# Patient Record
Sex: FEMALE | Race: WHITE | NOT HISPANIC OR LATINO | URBAN - NONMETROPOLITAN AREA
[De-identification: names, ages, dates, MRNs, and addresses within clinical notes are randomized per-mention and may not be internally consistent; named-entity substitution may affect disease eponyms.]

---

## 2017-01-11 ENCOUNTER — OFFICE VISIT (OUTPATIENT)
Dept: PSYCHIATRY | Facility: CLINIC | Age: 32
End: 2017-01-11

## 2017-01-11 VITALS — BODY MASS INDEX: 24.64 KG/M2 | WEIGHT: 157 LBS | HEIGHT: 67 IN

## 2017-01-11 DIAGNOSIS — F40.10 SOCIAL ANXIETY DISORDER: ICD-10-CM

## 2017-01-11 DIAGNOSIS — F90.2 ATTENTION DEFICIT HYPERACTIVITY DISORDER, COMBINED TYPE: Primary | ICD-10-CM

## 2017-01-11 PROCEDURE — 90792 PSYCH DIAG EVAL W/MED SRVCS: CPT | Performed by: NURSE PRACTITIONER

## 2017-01-11 RX ORDER — DEXTROAMPHETAMINE SACCHARATE, AMPHETAMINE ASPARTATE, DEXTROAMPHETAMINE SULFATE AND AMPHETAMINE SULFATE 2.5; 2.5; 2.5; 2.5 MG/1; MG/1; MG/1; MG/1
TABLET ORAL
Qty: 30 TABLET | Refills: 0
Start: 2017-01-11 | End: 2017-02-22 | Stop reason: SDUPTHER

## 2017-01-11 RX ORDER — DEXTROAMPHETAMINE SACCHARATE, AMPHETAMINE ASPARTATE MONOHYDRATE, DEXTROAMPHETAMINE SULFATE AND AMPHETAMINE SULFATE 5; 5; 5; 5 MG/1; MG/1; MG/1; MG/1
20 CAPSULE, EXTENDED RELEASE ORAL EVERY MORNING
Qty: 30 CAPSULE | Refills: 0
Start: 2017-01-11 | End: 2017-02-22 | Stop reason: SDUPTHER

## 2017-01-11 NOTE — PROGRESS NOTES
"    Subjective   Caryl Baltazar is a 31 y.o. , no children, employed, college graduate female who is here today for initial appointment. She and her  relocated to [place] from [place] for her 's work. Patient works from home     Chief Complaint:  Continuity of treatment for ADHD combined type      History of Present Illness  Patient presents by herself for psychiatric evaluation with intent to continue her treatment of ADHD. She reports struggle with academics throughout her primary school years only being able to get C's most of the time. She and her parents would work hard on her focus attention and ability to complete work but she continued to struggle. As a freshman in college her mother took her to be evaluated because she was failing college and knew her daughter was smart. She was diagnosed ADHD combined type and placed on treatment. She got straight A's and received a [employer] degree in statistics. 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 She describes when off medication she is a \"squirrel' easily distracted, restless, bores easily, can be going all day and not accomplish anything which cause great frustration. When she moved to [place] her MD from [place] gave her three scripts, her new PCP did not want to prescribe her medication and referred her here she reports. Confirmed with progress notes from [facility]. Renaldo reviewed and her statement is confirmed. Her UDS today was positive for amphetamine and she took 1/2 tablet two days ago which did spill in her urine, no other substances. Patient denies depression in past or suicidal thoughts or self harm. She denies illicit drug use. She drinks alcohol only socially and only occasionally. She reports no tobacco use. She denies abuse hx or legal issues ever. Patient likes to be organized with everything in its place but not OCD symptoms. Denies manic like symptoms. Does " have anxiety related to meeting new people and new situations alone. She will be very anxious she states in an interview or initiating a conversation with someone she doesn't know. She will be become sweaty, heart races, uncomfortable and worried she will be judged. This has caused some interruption in life with isolating and not open to new opportunities such as joining a book club on her own or going on job interviews. She reports good sleep at night, appetite as good. She denies adverse effects from current medication Adderall XR 20mg PO one AM and prn Adderall 10mg PO in afternoons as needed for focus. Patient denies irritability , paranoid thoughts, SI/HI or AVH ever. She denies seizures, denies pregnancy and she and  don't plan on children for 2 more years. She reports being on birth control and is compliant with the pill. She denies anger issues or aggressive behaviors ever. She and her  she describes as best friends and have been together nine years,  8 months. She denies inpatient treatment only outpatient for ADHD.     The following portions of the patient's history were reviewed and updated as appropriate: allergies, current medications, past family history, past medical history, past social history, past surgical history and problem list.      Past Psych History: Patient reports only outpatient treatment for ADHD beginning in Freshman year college and since. She is on Adderall XR 20mg PO one QAM when she works or needs focus and concentration skills. She is also on Adderall 10mg in afternoon as needed for booster to get through most work days. She denies suicidal attempts or thoughts ever, denies self harm behaviors ever. Denies inpatient psych treatment ever.     Substance Abuse:   Nicotine: denies   Alcohol: occasional social   Cannabis: denies   Benzodiazepines: denies   Opioids: denies   Other illicit drugs: denies     ABUSE HX: denies   LEGAL HX: denies     MAIKOL REVIEWED: no  "red flags  UDS: amphetamine prescribed     Family Psychiatric History:  family history is not on file.  Mother ADHD       Social History:  Born in [place] raised by her biological parents in [place] [facility], has one older brother who is biomed , mother is , dad is . Patient graduated from high school and went to [employer] but almost failed freshman year, got evaluated for ADHD, had moved back home was treated and restarted school with getting straight A's and graduating with BS in Statistics. She has been with her  9 years total,  for 8 months. Lived in [place] for  \"[place]'s work, he is Oil dispatch and works for [employer] in [facility] now\". They moved to [place] last June 2016. No children, planning in two years. Patient works for [employer] out of [facility] from [phone] and again 2015 to present, works from home, they purchased house and she is working on painting walls and decorating. They have two dogs and live on land in [place].     DEVELOPMENTAL HX: denies problems, was full term.     Medical/Surgical History:  Past Medical History   Diagnosis Date   • Asthma      Past Surgical History   Procedure Laterality Date   • Breast augmentation  01/2009       No Known Allergies    Current Medications:   Current Outpatient Prescriptions   Medication Sig Dispense Refill   • amphetamine-dextroamphetamine (ADDERALL) 10 MG tablet Take one tablet in afternoon as needed for focus 30 tablet 0   • amphetamine-dextroamphetamine XR (ADDERALL XR) 20 MG 24 hr capsule Take 1 capsule by mouth Every Morning. 30 capsule 0     No current facility-administered medications for this visit.          Review of Systems   Psychiatric/Behavioral: Positive for decreased concentration.   All other systems reviewed and are negative.   denies HEENT, cardiovascular, respiratory (has h/o asthma but has never used inhaler), liver, renal, GI/, " "endocrine, neuro, DERM, hematology, immunology, musculoskeletal disorders.  Denies concussions or head injuries, denies seizures ever   Objective   Physical Exam  Height 67\" (170.2 cm), weight 157 lb (71.2 kg).    Mental Status Exam:   Appearance: appropriate  Hygiene:   good  Cooperation:  Cooperative  Eye Contact:  Good  Psychomotor Behavior:  Appropriate  Mood:  anxious and euthymic  Affect:  Appropriate  Hopelessness: Denies  Speech:  Normal  Thought Process:  Linear  Thought Content:  Normal  Suicidal:  None  Homicidal:  None  Hallucinations:  None  Delusion:  None  Memory:  Intact  Orientation:  Person, Place, Time and Situation  Reliability:  good  Insight:  Good  Judgement:  Good  Impulse Control:  Good  Physical/Medical Issues:  No       Short-term goals: Patient will be compliant with clinic appointments.  Patient will be engaged in therapy, medication compliant with minimal side effects. Patient  will report decrease of symptoms and frequency.    Long-term goals: Patient will have minimal symptoms of mental health disorder with continued treatment. Patient will be compliant with treatment and appointments.       Problem list:  Focus attention organization ADHD sx's when not on med management , anxiety socially   Strengths: patient appears motivated for treatment is currently engaged and compliant supportive family  Weaknesses: coping skills        Assessment/Plan   Diagnoses and all orders for this visit:    Attention deficit hyperactivity disorder, combined type    Social anxiety disorder    Other orders  -     amphetamine-dextroamphetamine XR (ADDERALL XR) 20 MG 24 hr capsule; Take 1 capsule by mouth Every Morning.  -     amphetamine-dextroamphetamine (ADDERALL) 10 MG tablet; Take one tablet in afternoon as needed for focus      Discussed diagnosis and treatment plan with continuing what has been tolerated well and efficacious Adderall XR 20mg PO one in am and Adderall 10mg PO one tablet in afternoon as " needed for focus in later afternoons for work. Patient reports she doesn't usually take in afternoons or if not working, only when needs good concentration focus skills. Discussed social anxiety and symptoms that make socializing difficult and being out in public, discussed treatment including therapy and medication management with SSRI discussed citalopram or Lexapro, Prozac, and discussed risks, benefits, and side effects of the above medications and she stated she would think about treatment for anxiety. She  was agreeable with the plan for ADHD treatment and f/u plan.Patient was educated on the importance of compliance with treatment and follow-up appointments. Control Substance Policy was reviewed with MAIKOL and yoel UDS. Also to only get prescriptions from this provider or practice, and illegal to share medication or risk to be non compliant with instructions.   ·   Controlled substance prescriptions are either  printed off, telephoned in  or ordered through [facility] by provider    To call for questions or concerns and return early if necessary. Crisis plan reviewed including going to the Emergency department.     Return in about 3 months (around 4/11/2017).
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
## 2017-02-22 RX ORDER — DEXTROAMPHETAMINE SACCHARATE, AMPHETAMINE ASPARTATE MONOHYDRATE, DEXTROAMPHETAMINE SULFATE AND AMPHETAMINE SULFATE 5; 5; 5; 5 MG/1; MG/1; MG/1; MG/1
20 CAPSULE, EXTENDED RELEASE ORAL EVERY MORNING
Qty: 30 CAPSULE | Refills: 0
Start: 2017-02-22 | End: 2017-04-03 | Stop reason: SDUPTHER

## 2017-02-22 RX ORDER — DEXTROAMPHETAMINE SACCHARATE, AMPHETAMINE ASPARTATE, DEXTROAMPHETAMINE SULFATE AND AMPHETAMINE SULFATE 2.5; 2.5; 2.5; 2.5 MG/1; MG/1; MG/1; MG/1
TABLET ORAL
Qty: 30 TABLET | Refills: 0
Start: 2017-02-22 | End: 2017-04-03 | Stop reason: SDUPTHER

## 2017-02-22 NOTE — TELEPHONE ENCOUNTER
No I can't send these [facility], she needs to  at [facility] Yocasta Parkinson as last time with her ID.  Thanks so much I have refilled via [facility]

## 2017-04-03 RX ORDER — DEXTROAMPHETAMINE SACCHARATE, AMPHETAMINE ASPARTATE, DEXTROAMPHETAMINE SULFATE AND AMPHETAMINE SULFATE 2.5; 2.5; 2.5; 2.5 MG/1; MG/1; MG/1; MG/1
TABLET ORAL
Qty: 30 TABLET | Refills: 0
Start: 2017-04-03 | End: 2017-05-09 | Stop reason: SDUPTHER

## 2017-04-03 RX ORDER — DEXTROAMPHETAMINE SACCHARATE, AMPHETAMINE ASPARTATE MONOHYDRATE, DEXTROAMPHETAMINE SULFATE AND AMPHETAMINE SULFATE 5; 5; 5; 5 MG/1; MG/1; MG/1; MG/1
20 CAPSULE, EXTENDED RELEASE ORAL EVERY MORNING
Qty: 30 CAPSULE | Refills: 0
Start: 2017-04-03 | End: 2017-05-09 | Stop reason: SDUPTHER

## 2017-04-13 ENCOUNTER — OFFICE VISIT (OUTPATIENT)
Dept: PSYCHIATRY | Facility: CLINIC | Age: 32
End: 2017-04-13

## 2017-04-13 VITALS — WEIGHT: 155 LBS | BODY MASS INDEX: 24.33 KG/M2 | HEIGHT: 67 IN

## 2017-04-13 DIAGNOSIS — F90.2 ATTENTION DEFICIT HYPERACTIVITY DISORDER, COMBINED TYPE: Primary | ICD-10-CM

## 2017-04-13 DIAGNOSIS — F40.10 SOCIAL ANXIETY DISORDER: ICD-10-CM

## 2017-04-13 PROCEDURE — 99213 OFFICE O/P EST LOW 20 MIN: CPT | Performed by: NURSE PRACTITIONER

## 2017-04-13 NOTE — PROGRESS NOTES
"    Subjective   Caryl Baltazar is a 32 y.o. female who is here today for medication management follow up.    Chief Complaint: Diagnoses and all orders for this visit:    Attention deficit hyperactivity disorder, combined type    Social anxiety disorder      History of Present Illness  Good cognitive processing, completing tasks, staying focused. Mood improved, trying to do more socially. Going to [place] with  and parents in July. Feels more secure and confident. Sleeping well and eating well, goes to gym and works out. Feels positive about life and grateful. No new concerns. Scores anxiety 3.    (Scales based on 0 - 10 with 10 being the worst)        The following portions of the patient's history were reviewed and updated as appropriate: allergies, current medications, past family history, past medical history, past social history, past surgical history and problem list.    Review of Systemsdenies fever, cough, s/s’s of infection, denies GI/ problems, denies new medical issues     Objective   Physical Exam  Height 67\" (170.2 cm), weight 155 lb (70.3 kg).    No Known Allergies    Current Medications:   Current Outpatient Prescriptions   Medication Sig Dispense Refill   • amphetamine-dextroamphetamine (ADDERALL) 10 MG tablet Take one tablet in afternoon as needed for focus 30 tablet 0   • amphetamine-dextroamphetamine XR (ADDERALL XR) 20 MG 24 hr capsule Take 1 capsule by mouth Every Morning. 30 capsule 0     No current facility-administered medications for this visit.        Mental Status Exam:   Appearance: appropriate  Hygiene:   good  Cooperation:  Cooperative  Eye Contact:  Good  Psychomotor Behavior:  Appropriate  Mood:  euthymic  Affect:  Appropriate  Hopelessness: Denies  Speech:  Normal  Thought Process:  Linear  Thought Content:  Normal  Suicidal:  None  Homicidal:  None  Hallucinations:  None  Delusion:  None  Memory:  Intact  Orientation:  Person, Place, Time and Situation  Reliability:  " good  Insight:  Good  Judgement:  Good  Impulse Control:  Good  Estimated Intelligence: average range   Physical/Medical Issues:  No     Assessment/Plan   Diagnoses and all orders for this visit:    Attention deficit hyperactivity disorder, combined type    Social anxiety disorder      Cont med management has refills,   ·       We discussed risks, benefits, and side effects of the above medications and the patient was agreeable with the plan. Patient was educated on the importance of compliance with treatment and follow-up appointments.   Controlled substance prescriptions are either  printed off for patient, telephoned in  or ordered through [facility] by this provider  Instructed to call for questions or concerns and return early if necessary. Crisis plan reviewed including going to the Emergency department.    Return in about 3 months (around 7/13/2017).

## 2017-05-09 RX ORDER — DEXTROAMPHETAMINE SACCHARATE, AMPHETAMINE ASPARTATE MONOHYDRATE, DEXTROAMPHETAMINE SULFATE AND AMPHETAMINE SULFATE 5; 5; 5; 5 MG/1; MG/1; MG/1; MG/1
20 CAPSULE, EXTENDED RELEASE ORAL EVERY MORNING
Qty: 30 CAPSULE | Refills: 0
Start: 2017-05-09 | End: 2017-06-13 | Stop reason: SDUPTHER

## 2017-05-09 RX ORDER — DEXTROAMPHETAMINE SACCHARATE, AMPHETAMINE ASPARTATE, DEXTROAMPHETAMINE SULFATE AND AMPHETAMINE SULFATE 2.5; 2.5; 2.5; 2.5 MG/1; MG/1; MG/1; MG/1
TABLET ORAL
Qty: 30 TABLET | Refills: 0
Start: 2017-05-09 | End: 2017-06-13 | Stop reason: SDUPTHER

## 2017-06-14 RX ORDER — DEXTROAMPHETAMINE SACCHARATE, AMPHETAMINE ASPARTATE MONOHYDRATE, DEXTROAMPHETAMINE SULFATE AND AMPHETAMINE SULFATE 5; 5; 5; 5 MG/1; MG/1; MG/1; MG/1
20 CAPSULE, EXTENDED RELEASE ORAL EVERY MORNING
Qty: 30 CAPSULE | Refills: 0
Start: 2017-06-14 | End: 2017-07-13 | Stop reason: SDUPTHER

## 2017-06-14 RX ORDER — DEXTROAMPHETAMINE SACCHARATE, AMPHETAMINE ASPARTATE, DEXTROAMPHETAMINE SULFATE AND AMPHETAMINE SULFATE 2.5; 2.5; 2.5; 2.5 MG/1; MG/1; MG/1; MG/1
TABLET ORAL
Qty: 30 TABLET | Refills: 0
Start: 2017-06-14 | End: 2017-07-13 | Stop reason: SDUPTHER

## 2017-07-13 ENCOUNTER — OFFICE VISIT (OUTPATIENT)
Dept: PSYCHIATRY | Facility: CLINIC | Age: 32
End: 2017-07-13

## 2017-07-13 VITALS — HEIGHT: 67 IN | BODY MASS INDEX: 23.07 KG/M2 | WEIGHT: 147 LBS

## 2017-07-13 DIAGNOSIS — F40.10 SOCIAL ANXIETY DISORDER: ICD-10-CM

## 2017-07-13 DIAGNOSIS — F90.2 ATTENTION DEFICIT HYPERACTIVITY DISORDER, COMBINED TYPE: Primary | ICD-10-CM

## 2017-07-13 PROCEDURE — 99213 OFFICE O/P EST LOW 20 MIN: CPT | Performed by: NURSE PRACTITIONER

## 2017-07-13 RX ORDER — DEXTROAMPHETAMINE SACCHARATE, AMPHETAMINE ASPARTATE MONOHYDRATE, DEXTROAMPHETAMINE SULFATE AND AMPHETAMINE SULFATE 5; 5; 5; 5 MG/1; MG/1; MG/1; MG/1
20 CAPSULE, EXTENDED RELEASE ORAL EVERY MORNING
Qty: 30 CAPSULE | Refills: 0
Start: 2017-07-13 | End: 2017-08-22 | Stop reason: SDUPTHER

## 2017-07-13 RX ORDER — DEXTROAMPHETAMINE SACCHARATE, AMPHETAMINE ASPARTATE, DEXTROAMPHETAMINE SULFATE AND AMPHETAMINE SULFATE 2.5; 2.5; 2.5; 2.5 MG/1; MG/1; MG/1; MG/1
TABLET ORAL
Qty: 30 TABLET | Refills: 0
Start: 2017-07-13 | End: 2017-08-22 | Stop reason: SDUPTHER

## 2017-07-13 NOTE — PROGRESS NOTES
"  Subjective   Caryl Baltazar is a 32 y.o. female who is here today for medication management follow up.    Chief Complaint: ADHD and social anxiety     History of Present Illness Good cognitive processing, completing tasks, staying focused. Mood improved, trying to do more socially. Went to [place] with  and parents and had a great time. Feels more secure and confident. Sleeping well and eating well, goes to gym and works out. Feels positive about life and grateful. No new concerns. Scores anxiety 3 or less.       (Scales based on 0 - 10 with 10 being the worst)        The following portions of the patient's history were reviewed and updated as appropriate: allergies, current medications, past family history, past medical history, past social history, past surgical history and problem list.    Review of Systemsdenies fever, cough, s/s’s of infection, denies GI/ problems, denies new medical issues     Objective   Physical Exam  Height 67\" (170.2 cm), weight 147 lb (66.7 kg).    No Known Allergies    Current Medications:   Current Outpatient Prescriptions   Medication Sig Dispense Refill   • amphetamine-dextroamphetamine (ADDERALL) 10 MG tablet Take one tablet in afternoon as needed for focus 30 tablet 0   • amphetamine-dextroamphetamine XR (ADDERALL XR) 20 MG 24 hr capsule Take 1 capsule by mouth Every Morning. 30 capsule 0     No current facility-administered medications for this visit.        Mental Status Exam:   Appearance: appropriate  Hygiene:   good  Cooperation:  Cooperative  Eye Contact:  Good  Psychomotor Behavior:  Appropriate  Mood:  euthymic  Affect:  Appropriate  Hopelessness: Denies  Speech:  Normal  Thought Process:  Linear  Thought Content:  Normal  Suicidal:  None  Homicidal:  None  Hallucinations:  None  Delusion:  None  Memory:  Intact  Orientation:  Person, Place, Time and Situation  Reliability:  good  Insight:  Good  Judgement:  Good  Impulse Control:  Good  Estimated Intelligence: " average range   Physical/Medical Issues:  No     Assessment/Plan   Diagnoses and all orders for this visit:    Attention deficit hyperactivity disorder, combined type    Social anxiety disorder    Other orders  -     amphetamine-dextroamphetamine (ADDERALL) 10 MG tablet; Take one tablet in afternoon as needed for focus  -     amphetamine-dextroamphetamine XR (ADDERALL XR) 20 MG 24 hr capsule; Take 1 capsule by mouth Every Morning.        Stable, refill medications     We discussed risks, benefits, and side effects of the above medications and the patient was agreeable with the plan. Patient was educated on the importance of compliance with treatment and follow-up appointments.   Controlled substance prescriptions are either  printed off for patient, telephoned in  or ordered through RX[facility] by this provider  Instructed to call for questions or concerns and return early if necessary. Crisis plan reviewed including going to the Emergency department.    Return in about 3 months (around 10/13/2017).

## 2017-08-22 RX ORDER — DEXTROAMPHETAMINE SACCHARATE, AMPHETAMINE ASPARTATE MONOHYDRATE, DEXTROAMPHETAMINE SULFATE AND AMPHETAMINE SULFATE 5; 5; 5; 5 MG/1; MG/1; MG/1; MG/1
20 CAPSULE, EXTENDED RELEASE ORAL EVERY MORNING
Qty: 30 CAPSULE | Refills: 0
Start: 2017-08-22 | End: 2017-10-05 | Stop reason: SDUPTHER

## 2017-08-22 RX ORDER — DEXTROAMPHETAMINE SACCHARATE, AMPHETAMINE ASPARTATE, DEXTROAMPHETAMINE SULFATE AND AMPHETAMINE SULFATE 2.5; 2.5; 2.5; 2.5 MG/1; MG/1; MG/1; MG/1
TABLET ORAL
Qty: 30 TABLET | Refills: 0
Start: 2017-08-22 | End: 2017-10-05 | Stop reason: SDUPTHER

## 2017-10-05 RX ORDER — DEXTROAMPHETAMINE SACCHARATE, AMPHETAMINE ASPARTATE, DEXTROAMPHETAMINE SULFATE AND AMPHETAMINE SULFATE 2.5; 2.5; 2.5; 2.5 MG/1; MG/1; MG/1; MG/1
TABLET ORAL
Qty: 30 TABLET | Refills: 0
Start: 2017-10-05 | End: 2017-10-12

## 2017-10-05 RX ORDER — DEXTROAMPHETAMINE SACCHARATE, AMPHETAMINE ASPARTATE MONOHYDRATE, DEXTROAMPHETAMINE SULFATE AND AMPHETAMINE SULFATE 5; 5; 5; 5 MG/1; MG/1; MG/1; MG/1
20 CAPSULE, EXTENDED RELEASE ORAL EVERY MORNING
Qty: 30 CAPSULE | Refills: 0
Start: 2017-10-05 | End: 2017-10-12 | Stop reason: SDUPTHER

## 2017-10-12 ENCOUNTER — OFFICE VISIT (OUTPATIENT)
Dept: PSYCHIATRY | Facility: CLINIC | Age: 32
End: 2017-10-12

## 2017-10-12 VITALS — HEIGHT: 67 IN | BODY MASS INDEX: 21.82 KG/M2 | WEIGHT: 139 LBS

## 2017-10-12 DIAGNOSIS — F40.10 SOCIAL ANXIETY DISORDER: ICD-10-CM

## 2017-10-12 DIAGNOSIS — F90.2 ATTENTION DEFICIT HYPERACTIVITY DISORDER, COMBINED TYPE: Primary | ICD-10-CM

## 2017-10-12 PROCEDURE — 99213 OFFICE O/P EST LOW 20 MIN: CPT | Performed by: NURSE PRACTITIONER

## 2017-10-12 RX ORDER — DEXTROAMPHETAMINE SACCHARATE, AMPHETAMINE ASPARTATE MONOHYDRATE, DEXTROAMPHETAMINE SULFATE AND AMPHETAMINE SULFATE 7.5; 7.5; 7.5; 7.5 MG/1; MG/1; MG/1; MG/1
30 CAPSULE, EXTENDED RELEASE ORAL EVERY MORNING
Qty: 30 CAPSULE | Refills: 0
Start: 2017-10-12 | End: 2017-11-13 | Stop reason: SDUPTHER

## 2017-10-12 RX ORDER — DEXTROAMPHETAMINE SACCHARATE, AMPHETAMINE ASPARTATE, DEXTROAMPHETAMINE SULFATE AND AMPHETAMINE SULFATE 5; 5; 5; 5 MG/1; MG/1; MG/1; MG/1
TABLET ORAL
Qty: 30 TABLET | Refills: 0
Start: 2017-10-12 | End: 2017-11-13 | Stop reason: SDUPTHER

## 2017-10-12 NOTE — PROGRESS NOTES
"  Subjective   Caryl Baltazar is a 32 y.o. female who is here today for medication management follow up.    Chief Complaint: ADHD    History of Present Illness Patient presents by herself for f/u. She reports she is working now and notices in afternoons she is much less focused and difficulty completing tasks. She reports euthymic mood and denies anxiety. She states she feels a lot better working and more engaged with others, denies panic or increase anxiety socially. She is having a surprise party for her  this weekend and is enjoying the planning of it. Denies adverse effects from medication. Sleeping and her eating is same but stands at work long hours and is much more active so weight is coming off but healthy.     (Scales based on 0 - 10 with 10 being the worst)        The following portions of the patient's history were reviewed and updated as appropriate: allergies, current medications, past family history, past medical history, past social history, past surgical history and problem list.    Review of Systemsdenies fever, cough, s/s’s of infection, denies GI/ problems, denies new medical issues     Objective   Physical Exam  Height 67\" (170.2 cm), weight 139 lb (63 kg).    No Known Allergies    Current Medications:   Current Outpatient Prescriptions   Medication Sig Dispense Refill   • amphetamine-dextroamphetamine (ADDERALL) 20 MG tablet Take one tablet in afternoon daily 30 tablet 0   • amphetamine-dextroamphetamine XR (ADDERALL XR) 30 MG 24 hr capsule Take 1 capsule by mouth Every Morning. 30 capsule 0     No current facility-administered medications for this visit.        Mental Status Exam:   Appearance: appropriate  Hygiene:   good  Cooperation:  Cooperative  Eye Contact:  Good  Psychomotor Behavior:  Appropriate  Mood:  within normal limits  Affect:  Appropriate  Hopelessness: Denies  Speech:  Normal  Thought Process:  Linear  Thought Content:  Normal  Suicidal:  None  Homicidal:  " None  Hallucinations:  None  Delusion:  None  Memory:  Intact  Orientation:  Person, Place, Time and Situation  Reliability:  good  Insight:  Good  Judgement:  Good  Impulse Control:  Good  Estimated Intelligence: average range   Physical/Medical Issues:  No     Assessment/Plan   Diagnoses and all orders for this visit:    Attention deficit hyperactivity disorder, combined type    Social anxiety disorder    Other orders  -     amphetamine-dextroamphetamine XR (ADDERALL XR) 30 MG 24 hr capsule; Take 1 capsule by mouth Every Morning.  -     amphetamine-dextroamphetamine (ADDERALL) 20 MG tablet; Take one tablet in afternoon daily    residual ADHD sx's will increase morning Adderall xr to 30mg and increase afternoon dose to 20mg 1/2-1 tablet late afternoon.    We discussed risks, benefits, and side effects of the above medications and the patient was agreeable with the plan. Patient was educated on the importance of compliance with treatment and follow-up appointments.   Controlled substance prescriptions are either  printed off for patient, telephoned in  or ordered through [facility] by this provider  Instructed to call for questions or concerns and return early if necessary. Crisis plan reviewed including going to the Emergency department.    Return in about 3 months (around 1/12/2018).

## 2017-11-13 RX ORDER — DEXTROAMPHETAMINE SACCHARATE, AMPHETAMINE ASPARTATE MONOHYDRATE, DEXTROAMPHETAMINE SULFATE AND AMPHETAMINE SULFATE 7.5; 7.5; 7.5; 7.5 MG/1; MG/1; MG/1; MG/1
30 CAPSULE, EXTENDED RELEASE ORAL EVERY MORNING
Qty: 30 CAPSULE | Refills: 0
Start: 2017-11-13 | End: 2017-12-21 | Stop reason: SDUPTHER

## 2017-11-13 RX ORDER — DEXTROAMPHETAMINE SACCHARATE, AMPHETAMINE ASPARTATE, DEXTROAMPHETAMINE SULFATE AND AMPHETAMINE SULFATE 5; 5; 5; 5 MG/1; MG/1; MG/1; MG/1
TABLET ORAL
Qty: 30 TABLET | Refills: 0
Start: 2017-11-13 | End: 2017-12-21 | Stop reason: SDUPTHER

## 2017-11-29 ENCOUNTER — DOCUMENTATION (OUTPATIENT)
Dept: OTHER | Facility: HOSPITAL | Age: 32
End: 2017-11-29

## 2017-11-29 NOTE — PROGRESS NOTES
Tyrone from [facility] called today and left a message checking to see if patient has an appointment with Genetics. I talked with Jessica Spears in Genetics and she called the patient and left her a message to please call Genetics to schedule an appointment. AG

## 2017-12-08 ENCOUNTER — CLINICAL SUPPORT (OUTPATIENT)
Dept: GENETICS | Facility: HOSPITAL | Age: 32
End: 2017-12-08

## 2017-12-08 ENCOUNTER — LAB (OUTPATIENT)
Dept: LAB | Facility: HOSPITAL | Age: 32
End: 2017-12-08

## 2017-12-08 DIAGNOSIS — Z80.3 FAMILY HISTORY OF BREAST CANCER: ICD-10-CM

## 2017-12-08 DIAGNOSIS — Z13.79 GENETIC TESTING: Primary | ICD-10-CM

## 2017-12-08 DIAGNOSIS — Z80.42 FAMILY HISTORY OF PROSTATE CANCER: ICD-10-CM

## 2017-12-08 DIAGNOSIS — Z84.81 FAMILY HISTORY OF BRCA1 GENE POSITIVE: Primary | ICD-10-CM

## 2017-12-11 NOTE — PROGRESS NOTES
Caryl Baltazar is a 32-year old female who was seen for genetic counseling due to a family history of breast cancer, as well as a known BRCA1 mutation that was identified in her mother. She was 15 years old at menarche and is nulliparous. She retains her uterus and ovaries.  Ms. Baltazar was interested in discussing her risk for a BRCA1 mutation and opted to pursue single site testing for the known mutation identified in the family. Single site testing for the BRCA1 mutation was ordered through [facility]. Results are expected in 2-3 weeks.      PERTINENT FAMILY HISTORY: (See attached pedigree)   Mother:  Breast cancer, 46; Contralateral breast cancer, 49 (BRCA1+)  Mat. Grandmother: Pancreatic cancer, 90  Mat. Grandfather: Prostate cancer, 80  Mat. Great-Aunt: Breast cancer, 40s  Mat. Great- Aunt: Breast cancer, 50s  Mat. Great Aunt: Breast cancer, 40s; Fallopian tube cancer, 70s  Pat. Grandmother: Bladder cancer, 70s     A copy of Ms. Baltazar’s mother’s genetic test result confirming the BRCA1 mutation was available for review.    RISK ASSESSMENT:  Ms. Baltazar’s risk for carrying a BRCA1 mutation prior to testing is 50% based on family history.     GENETIC COUNSELING (30 minutes): We reviewed the family history information in detail.  Cases of cancer follow three general patterns: sporadic, familial, and hereditary.  While most cancer is sporadic, some cases appear to occur in family clusters.  These cases are said to be familial and account for 10-20% of breast cancer cases.  Familial cases may be due to a combination of shared genes and environmental factors among family members.  In even fewer families, the cancer is said to be inherited, and the genes responsible for the cancer are known.      Family histories typical of hereditary cancer syndromes usually include multiple first- and second-degree relatives diagnosed with cancer types that define a syndrome.  These cases tend to be diagnosed at  younger-than-expected ages and can be bilateral or multifocal.  The cancer in these families follows an autosomal dominant inheritance pattern, which indicates the likely presence of a mutation in a cancer susceptibility gene.  Children and siblings of an individual believed to carry this mutation have a 50% chance of inheriting that mutation, thereby inheriting the increased risk to develop cancer.  These mutations can be passed down from the maternal or the paternal lineage.     Based on Ms. Baltazar’s family history, we discussed hereditary breast and ovarian cancer syndrome. A significant proportion of hereditary breast and ovarian cancer can be attributed to mutations in the BRCA1 and BRCA2 genes.  Mutations in these genes confer an increased risk for breast cancer, ovarian cancer, male breast cancer, prostate cancer and pancreatic cancer.  Women with a BRCA1 mutation have up to an 87% risk of breast cancer, and up to a 44% risk of ovarian cancer. Additionally, there is an elevated risk for male breast cancer and pancreatic cancer, and up to a 16% risk for prostate cancer for males with a BRCA1 mutation.    GENETIC TESTING:  The risks, benefits and limitations of genetic testing and implications for clinical management following testing were reviewed.  DNA test results can influence decisions regarding screening, prevention and surgical management.  Genetic testing can have significant psychological implications for both individuals and families.  Also discussed was the possibility of employment and insurance discrimination based on genetic test results and the laws in place to prevent this. The implications of a positive or negative test result were discussed.    Genetic counseling and testing for the known familial BRCA1 mutation are available to Ms. Baltazar’ at-risk family members.  Until each at-risk family member has been proven not to carry a BRCA1 mutation, he or she should be offered increased surveillance  and chemoprevention.  We would be happy to see family members who live in the [place] area in our clinic to further discuss this information and testing options.  They can call our office at [phone] to schedule an appointment.  For family members who live elsewhere, there are genetic counselors at most [facility] centers.  They can find a genetic counselor by visiting the National Society of Genetic Counselors website at www.nsgc.org or they can call our office and we would be happy to give them the contact information of the closest genetic counselor.      PLAN: Ms. Baltazar opted to pursue single site BRCA1 testing through [facility]. Results are expected in 2-3 weeks at which time I will contact Ms. Baltazar by telephone. She is welcome to contact us in the meantime with any questions or concerns.         Kavitha Huffman MS  Genetic Counselor

## 2017-12-21 ENCOUNTER — DOCUMENTATION (OUTPATIENT)
Dept: GENETICS | Facility: HOSPITAL | Age: 32
End: 2017-12-21

## 2017-12-21 RX ORDER — DEXTROAMPHETAMINE SACCHARATE, AMPHETAMINE ASPARTATE MONOHYDRATE, DEXTROAMPHETAMINE SULFATE AND AMPHETAMINE SULFATE 7.5; 7.5; 7.5; 7.5 MG/1; MG/1; MG/1; MG/1
30 CAPSULE, EXTENDED RELEASE ORAL EVERY MORNING
Qty: 30 CAPSULE | Refills: 0
Start: 2017-12-21 | End: 2018-02-08 | Stop reason: SDUPTHER

## 2017-12-21 RX ORDER — DEXTROAMPHETAMINE SACCHARATE, AMPHETAMINE ASPARTATE, DEXTROAMPHETAMINE SULFATE AND AMPHETAMINE SULFATE 5; 5; 5; 5 MG/1; MG/1; MG/1; MG/1
TABLET ORAL
Qty: 30 TABLET | Refills: 0
Start: 2017-12-21 | End: 2018-02-08 | Stop reason: SDUPTHER

## 2017-12-21 NOTE — PROGRESS NOTES
Caryl Baltazar is a 32-year old female who was seen for genetic counseling due to a family history of breast cancer, as well as a known BRCA1 mutation that was identified in her mother. She was 15 years old at menarche and is nulliparous. She retains her uterus and ovaries.  Ms. Baltazar was interested in discussing her risk for a BRCA1 mutation and opted to pursue single site testing for the known mutation identified in the family. Single site testing for the BRCA1 mutation was ordered through [facility]. Genetic testing was negative for the familial mutation in the BRCA1 gene.  These normal results were discussed by telephone with Ms. Baltazar on 12/21/17.    PERTINENT FAMILY HISTORY: (See attached pedigree)   Mother:  Breast cancer, 46; Contralateral breast cancer, 49 (BRCA1+)  Mat. Grandmother: Pancreatic cancer, 90  Mat. Grandfather: Prostate cancer, 80  Mat. Great-Aunt: Breast cancer, 40s  Mat. Great- Aunt: Breast cancer, 50s  Mat. Great Aunt: Breast cancer, 40s; Fallopian tube cancer, 70s  Pat. Grandmother: Bladder cancer, 70s     A copy of Ms. Baltazar’s mother’s genetic test result confirming the BRCA1 mutation was available for review.    RISK ASSESSMENT:  Ms. Baltazar’s risk for carrying a BRCA1 mutation prior to testing is 50% based on family history.     GENETIC COUNSELING (30 minutes): We reviewed the family history information in detail.  Cases of cancer follow three general patterns: sporadic, familial, and hereditary.  While most cancer is sporadic, some cases appear to occur in family clusters.  These cases are said to be familial and account for 10-20% of breast cancer cases.  Familial cases may be due to a combination of shared genes and environmental factors among family members.  In even fewer families, the cancer is said to be inherited, and the genes responsible for the cancer are known.      Family histories typical of hereditary cancer syndromes usually include multiple first- and second-degree  relatives diagnosed with cancer types that define a syndrome.  These cases tend to be diagnosed at younger-than-expected ages and can be bilateral or multifocal.  The cancer in these families follows an autosomal dominant inheritance pattern, which indicates the likely presence of a mutation in a cancer susceptibility gene.  Children and siblings of an individual believed to carry this mutation have a 50% chance of inheriting that mutation, thereby inheriting the increased risk to develop cancer.  These mutations can be passed down from the maternal or the paternal lineage.     Based on Ms. Baltazar’s family history, we discussed hereditary breast and ovarian cancer syndrome. A significant proportion of hereditary breast and ovarian cancer can be attributed to mutations in the BRCA1 and BRCA2 genes.  Mutations in these genes confer an increased risk for breast cancer, ovarian cancer, male breast cancer, prostate cancer and pancreatic cancer.  Women with a BRCA1 mutation have up to an 87% risk of breast cancer, and up to a 44% risk of ovarian cancer. Additionally, there is an elevated risk for male breast cancer and pancreatic cancer, and up to a 16% risk for prostate cancer for males with a BRCA1 mutation.    GENETIC TESTING:  The risks, benefits and limitations of genetic testing and implications for clinical management following testing were reviewed.  DNA test results can influence decisions regarding screening, prevention and surgical management.  Genetic testing can have significant psychological implications for both individuals and families.  Also discussed was the possibility of employment and insurance discrimination based on genetic test results and the laws in place to prevent this. The implications of a positive or negative test result were discussed.    Genetic counseling and testing for the known familial BRCA1 mutation are available to Ms. Baltazar’s at-risk family members.  Until each at-risk family member  has been proven not to carry a BRCA1 mutation, he or she should be offered increased surveillance and chemoprevention.  We would be happy to see family members who live in the [place] area in our clinic to further discuss this information and testing options.  They can call our office at [phone] to schedule an appointment.  For family members who live elsewhere, there are genetic counselors at most [facility] centers.  They can find a genetic counselor by visiting the National Society of Genetic Counselors website at www.nsgc.org or they can call our office and we would be happy to give them the contact information of the closest genetic counselor.      TEST RESULTS:  Genetic testing was negative for the known familial mutation in the BRCA1 gene; therefore, Ms. Baltazar is a “true negative”.  This result indicates that Ms. Baltazar is only at general population risk for breast and ovarian cancer.  Given Ms. Baltazar’s negative test results, we recommend general population breast cancer screening, including self-breast exams, annual clinical breast exams, and annual mammography beginning at age 40. Furthermore, her future children are not at risk for having inherited the familial BRCA1 mutation.    PLAN: Genetic counseling remains available to Ms. Baltazar and her other at-risk family members.  Should Ms. Baltazar or any of her family members have any questions or concerns, they should feel free to contact us.      Kavitha Huffman MS  Genetic Counselor     Cc: KIRSTY Acuna

## 2017-12-21 NOTE — TELEPHONE ENCOUNTER
Oh I'm not sure they will fill it, shoot, but I'll send why didn't she  before she left ?she could have filled it. Oh well, cancel the one I sent to her phar here and Ill attempt [place], but it is a control so may not work

## 2018-02-08 ENCOUNTER — OFFICE VISIT (OUTPATIENT)
Dept: PSYCHIATRY | Facility: CLINIC | Age: 33
End: 2018-02-08

## 2018-02-08 VITALS — HEIGHT: 67 IN | BODY MASS INDEX: 21.66 KG/M2 | WEIGHT: 138 LBS

## 2018-02-08 DIAGNOSIS — F90.2 ATTENTION DEFICIT HYPERACTIVITY DISORDER, COMBINED TYPE: Primary | ICD-10-CM

## 2018-02-08 DIAGNOSIS — F40.10 SOCIAL ANXIETY DISORDER: ICD-10-CM

## 2018-02-08 PROCEDURE — 99213 OFFICE O/P EST LOW 20 MIN: CPT | Performed by: NURSE PRACTITIONER

## 2018-02-08 RX ORDER — DEXTROAMPHETAMINE SACCHARATE, AMPHETAMINE ASPARTATE, DEXTROAMPHETAMINE SULFATE AND AMPHETAMINE SULFATE 5; 5; 5; 5 MG/1; MG/1; MG/1; MG/1
TABLET ORAL
Qty: 30 TABLET | Refills: 0
Start: 2018-02-08 | End: 2018-04-02 | Stop reason: SDUPTHER

## 2018-02-08 RX ORDER — DEXTROAMPHETAMINE SACCHARATE, AMPHETAMINE ASPARTATE MONOHYDRATE, DEXTROAMPHETAMINE SULFATE AND AMPHETAMINE SULFATE 7.5; 7.5; 7.5; 7.5 MG/1; MG/1; MG/1; MG/1
30 CAPSULE, EXTENDED RELEASE ORAL EVERY MORNING
Qty: 30 CAPSULE | Refills: 0
Start: 2018-02-08 | End: 2018-04-02 | Stop reason: SDUPTHER

## 2018-02-08 NOTE — PROGRESS NOTES
"    Subjective   Caryl Baltazar is a 32 y.o. female who is here today for medication management follow up.    Chief Complaint: Diagnoses and all orders for this visit:    Attention deficit hyperactivity disorder, combined type    Social anxiety disorder    Other orders  -     amphetamine-dextroamphetamine (ADDERALL) 20 MG tablet; Take one tablet in afternoon daily  -     amphetamine-dextroamphetamine XR (ADDERALL XR) 30 MG 24 hr capsule; Take 1 capsule by mouth Every Morning        History of Present Illness Patient presents by herself and states she is doing great.  He states marriage is good, working for [employer] for the past 6 months has been very helpful and she denies social anxiety in that atmosphere.  She reports focus concentration task completion organization as doing well on her current medication.  She reports both at work and at home she is doing well with reduction in ADHD symptoms.  She reports good sleep denies depression or anxiety.  Denies adverse effects from medications.   (Scales based on 0 - 10 with 10 being the worst)        The following portions of the patient's history were reviewed and updated as appropriate: allergies, current medications, past family history, past medical history, past social history, past surgical history and problem list.    Review of Systemsdenies fever, cough, s/s’s of infection, denies GI/ problems, denies new medical issues     Objective   Physical Exam  Height 170.2 cm (67\"), weight 62.6 kg (138 lb).    No Known Allergies    Current Medications:   Current Outpatient Prescriptions   Medication Sig Dispense Refill   • amphetamine-dextroamphetamine (ADDERALL) 20 MG tablet Take one tablet in afternoon daily 30 tablet 0   • amphetamine-dextroamphetamine XR (ADDERALL XR) 30 MG 24 hr capsule Take 1 capsule by mouth Every Morning 30 capsule 0     No current facility-administered medications for this visit.      Appearance: appropriate  Hygiene:   good  Cooperation:  " Cooperative  Eye Contact:  Good  Psychomotor Behavior:  Appropriate  Mood:  within normal limits  Affect:  Appropriate  Hopelessness: Denies  Speech:  Normal  Thought Process:  Linear  Thought Content:  Normal  Suicidal:  None  Homicidal:  None  Hallucinations:  None  Delusion:  None  Memory:  Intact  Orientation:  Person, Place, Time and Situation  Reliability:  fair  Insight:  Fair  Judgement:  Fair  Impulse Control:  Fair  Estimated Intelligence: average range   Physical/Medical Issues:  No     MAIKOL REVIEWED NO RED FLAGS      Assessment/Plan   Diagnoses and all orders for this visit:    Attention deficit hyperactivity disorder, combined type    Social anxiety disorder    Other orders  -     amphetamine-dextroamphetamine (ADDERALL) 20 MG tablet; Take one tablet in afternoon daily  -     amphetamine-dextroamphetamine XR (ADDERALL XR) 30 MG 24 hr capsule; Take 1 capsule by mouth Every Morning      Moy on current medication management  Refill Adderall 20 mg 1 by mouth every afternoon as needed for focus and concentration  Refill Adderall XR 30 mg 1 by mouth every morning for ADHD  Stable vital signs and weight  We discussed risks, benefits, and side effects of the above medications and the patient was agreeable with the plan. Patient was educated on the importance of compliance with treatment and follow-up appointments.   Controlled substance prescriptions are either  printed off for patient, telephoned in  or ordered through [facility] by this provider  Instructed to call for questions or concerns and return early if necessary. Crisis plan reviewed including going to the Emergency department.    Return in about 3 months (around 5/8/2018).         Please note that portions of this note were completed with a voice recognition program.  Efforts were made to edit dictation, but occasionally words are mistranscribed.

## 2018-04-02 RX ORDER — DEXTROAMPHETAMINE SACCHARATE, AMPHETAMINE ASPARTATE MONOHYDRATE, DEXTROAMPHETAMINE SULFATE AND AMPHETAMINE SULFATE 7.5; 7.5; 7.5; 7.5 MG/1; MG/1; MG/1; MG/1
30 CAPSULE, EXTENDED RELEASE ORAL EVERY MORNING
Qty: 30 CAPSULE | Refills: 0 | Status: SHIPPED | OUTPATIENT
Start: 2018-04-02 | End: 2018-05-03 | Stop reason: SDUPTHER

## 2018-04-02 RX ORDER — DEXTROAMPHETAMINE SACCHARATE, AMPHETAMINE ASPARTATE, DEXTROAMPHETAMINE SULFATE AND AMPHETAMINE SULFATE 5; 5; 5; 5 MG/1; MG/1; MG/1; MG/1
TABLET ORAL
Qty: 30 TABLET | Refills: 0 | Status: SHIPPED | OUTPATIENT
Start: 2018-04-02 | End: 2018-05-03 | Stop reason: SDUPTHER

## 2018-05-03 ENCOUNTER — OFFICE VISIT (OUTPATIENT)
Dept: PSYCHIATRY | Facility: CLINIC | Age: 33
End: 2018-05-03

## 2018-05-03 VITALS — SYSTOLIC BLOOD PRESSURE: 110 MMHG | WEIGHT: 140 LBS | BODY MASS INDEX: 21.93 KG/M2 | DIASTOLIC BLOOD PRESSURE: 70 MMHG

## 2018-05-03 DIAGNOSIS — F40.10 SOCIAL ANXIETY DISORDER: ICD-10-CM

## 2018-05-03 DIAGNOSIS — F90.2 ATTENTION DEFICIT HYPERACTIVITY DISORDER, COMBINED TYPE: Primary | ICD-10-CM

## 2018-05-03 PROCEDURE — 99213 OFFICE O/P EST LOW 20 MIN: CPT | Performed by: NURSE PRACTITIONER

## 2018-05-03 RX ORDER — DEXTROAMPHETAMINE SACCHARATE, AMPHETAMINE ASPARTATE, DEXTROAMPHETAMINE SULFATE AND AMPHETAMINE SULFATE 5; 5; 5; 5 MG/1; MG/1; MG/1; MG/1
TABLET ORAL
Qty: 30 TABLET | Refills: 0 | Status: SHIPPED | OUTPATIENT
Start: 2018-05-03 | End: 2018-05-31 | Stop reason: SDUPTHER

## 2018-05-03 RX ORDER — DEXTROAMPHETAMINE SACCHARATE, AMPHETAMINE ASPARTATE MONOHYDRATE, DEXTROAMPHETAMINE SULFATE AND AMPHETAMINE SULFATE 7.5; 7.5; 7.5; 7.5 MG/1; MG/1; MG/1; MG/1
30 CAPSULE, EXTENDED RELEASE ORAL EVERY MORNING
Qty: 30 CAPSULE | Refills: 0 | Status: SHIPPED | OUTPATIENT
Start: 2018-05-03 | End: 2018-05-31 | Stop reason: SDUPTHER

## 2018-05-03 NOTE — PROGRESS NOTES
"  Subjective   Caryl Baltazar is a 33 y.o. female who is here today for medication management follow up.    Chief Complaint: ADHD combined type, h/o social anxiety disorder     History of Present Illness Patient presents for f/u by herself. She reports work with [employer] has been going well but her  got a transfer in his job to [place] area. They are moving this weekend. She reports her anxiety has been much lower because she has made friends has to begin over again in [place]. She describes herself as introvert \"I just can't walk up to people and start a conversation so I seem like I don't like people\". 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 Patient reports focus and concentration good with medications otherwise is scattered, disorganized, takes a long time to get things done without distraction off medication.Sleeping well eating well.  Denies adverse effects from medications. She and  plans to start a family in near future. Discussed unable to take Adderall if pregnant and to work with new mental health provider in [facility] regarding this, may use Omega oils during pregnancy for ADHD.     (Scales based on 0 - 10 with 10 being the worst)    The following portions of the patient's history were reviewed and updated as appropriate: allergies, current medications, past family history, past medical history, past social history, past surgical history and problem list.    Review of Systems   Constitutional: Negative for appetite change, chills, diaphoresis, fatigue, fever and unexpected weight change.   HENT: Negative for hearing loss, sore throat, trouble swallowing and voice change.    Eyes: Negative for photophobia and visual disturbance.   Respiratory: Negative for cough, chest tightness and shortness of breath.    Cardiovascular: Negative for chest pain and palpitations.   Gastrointestinal: Negative for abdominal pain, " constipation, nausea and vomiting.   Endocrine: Negative for cold intolerance and heat intolerance.   Genitourinary: Negative for dysuria and frequency.   Musculoskeletal: Negative for arthralgia, back pain, joint swelling and neck stiffness.   Skin: Negative for color change and wound.   Allergic/Immunologic: Negative for environmental allergies and immunocompromised state.   Neurological: Negative for dizziness, tremors, seizures, syncope, weakness, light-headedness and headaches.   Hematological: Negative for adenopathy. Does not bruise/bleed easily.    Objective   Physical Exam  Blood pressure 110/70, weight 63.5 kg (140 lb).    No Known Allergies    Current Medications:   Current Outpatient Prescriptions   Medication Sig Dispense Refill   • amphetamine-dextroamphetamine (ADDERALL) 20 MG tablet Take one tablet in afternoon daily 30 tablet 0   • amphetamine-dextroamphetamine XR (ADDERALL XR) 30 MG 24 hr capsule Take 1 capsule by mouth Every Morning 30 capsule 0     No current facility-administered medications for this visit.         Appearance: appropriate  Hygiene:   good  Cooperation:  Cooperative  Eye Contact:  Good  Psychomotor Behavior:  Appropriate  Mood:  within normal limits  Affect:  Appropriate  Hopelessness: Denies  Speech:  Normal  Thought Process:  Linear  Thought Content:  Normal  Suicidal:  None  Homicidal:  None  Hallucinations:  None  Delusion:  None  Memory:  Intact  Orientation:  Person, Place, Time and Situation  Reliability:  fair  Insight:  Fair  Judgement:  Fair  Impulse Control:  Fair  Estimated Intelligence: average range   Physical/Medical Issues:  No     MAIKOL REVIEWED NO RED FLAGS reviewed today   UDS: + for amphetamine congruent with treatment     Assessment/Plan   Diagnoses and all orders for this visit:    Attention deficit hyperactivity disorder, combined type    Social anxiety disorder    Other orders  -     amphetamine-dextroamphetamine (ADDERALL) 20 MG tablet; Take one tablet  in afternoon daily  -     amphetamine-dextroamphetamine XR (ADDERALL XR) 30 MG 24 hr capsule; Take 1 capsule by mouth Every Morning    stable, doing well with minimal ADHD symptoms and mood stable handling impending move well  Future pregnancy plan discussed need to lower and stop Adderall when that time comes.   We discussed risks, benefits, and side effects of the above medications and the patient was agreeable with the plan. Patient was educated on the importance of compliance with treatment and follow-up appointments.     Sleep hygiene reviewed, encouraged more whole foods, less processed foods, fruits veg  Coping skills reviewed and encouraged positive framing of thoughts    Assisted patient in processing above session content; acknowledged and normalized patient’s thoughts, feelings, and concerns.  Applied  positive coping skills and behavior management in session.  Allowed patient to freely discuss issues without interruption or judgment. Provided safe, confidential environment to facilitate the development of positive therapeutic relationship and encourage open, honest communication. Assisted patient in identifying risk factors which would indicate the need for higher level of care including thoughts to harm self or others and/or self-harming behavior and encouraged patient to contact this office, call 911, or present to the nearest emergency room should any of these events occur. Discussed crisis intervention services and means to access.  Patient adamantly and convincingly denies current suicidal or homicidal ideation or perceptual disturbance.    Instructed to call for questions or concerns and return early if necessary. Crisis plan reviewed including going to the Emergency department.    Return in about 3 months (around 8/3/2018).  If still in area, otherwise encouraged to get mental health provider appointment ASAP in [place]

## 2018-05-31 RX ORDER — DEXTROAMPHETAMINE SACCHARATE, AMPHETAMINE ASPARTATE MONOHYDRATE, DEXTROAMPHETAMINE SULFATE AND AMPHETAMINE SULFATE 7.5; 7.5; 7.5; 7.5 MG/1; MG/1; MG/1; MG/1
30 CAPSULE, EXTENDED RELEASE ORAL EVERY MORNING
Qty: 30 CAPSULE | Refills: 0 | Status: SHIPPED | OUTPATIENT
Start: 2018-05-31

## 2018-05-31 RX ORDER — DEXTROAMPHETAMINE SACCHARATE, AMPHETAMINE ASPARTATE, DEXTROAMPHETAMINE SULFATE AND AMPHETAMINE SULFATE 5; 5; 5; 5 MG/1; MG/1; MG/1; MG/1
TABLET ORAL
Qty: 30 TABLET | Refills: 0 | Status: SHIPPED | OUTPATIENT
Start: 2018-05-31